# Patient Record
Sex: FEMALE | Race: WHITE | NOT HISPANIC OR LATINO | URBAN - METROPOLITAN AREA
[De-identification: names, ages, dates, MRNs, and addresses within clinical notes are randomized per-mention and may not be internally consistent; named-entity substitution may affect disease eponyms.]

---

## 2017-04-25 ENCOUNTER — IMPORTED ENCOUNTER (OUTPATIENT)
Dept: URBAN - METROPOLITAN AREA CLINIC 1 | Facility: CLINIC | Age: 77
End: 2017-04-25

## 2017-04-25 PROBLEM — Z79.4: Noted: 2017-04-25

## 2017-04-25 PROBLEM — H26.491: Noted: 2017-04-25

## 2017-04-25 PROBLEM — H16.143: Noted: 2017-04-25

## 2017-04-25 PROBLEM — H40.023: Noted: 2017-04-25

## 2017-04-25 PROBLEM — E11.9: Noted: 2017-04-25

## 2017-04-25 PROBLEM — Z79.84: Noted: 2017-04-25

## 2017-04-25 PROBLEM — Z96.1: Noted: 2017-04-25

## 2017-04-25 PROBLEM — H04.123: Noted: 2017-04-25

## 2017-04-25 PROCEDURE — 92014 COMPRE OPH EXAM EST PT 1/>: CPT

## 2017-04-25 PROCEDURE — 92133 CPTRZD OPH DX IMG PST SGM ON: CPT

## 2017-04-25 PROCEDURE — 92015 DETERMINE REFRACTIVE STATE: CPT

## 2017-04-25 NOTE — PATIENT DISCUSSION
1.  DM Type II without sign of diabetic retinopathy and no blot heme on dilated retinal examination today OU No Macular Edema:  Stable. Discussed the pathophysiology of diabetes and its effect on the eye and risk of blindness. Stressed the importance of strong glucose control. Advised of importance of at least yearly dilated examinations but to contact us immediately for any problems or concerns. 2. Type II diabetes controlled by oral medications and insulin. 3.  PCO OD- Visually Significant and yag cap recommended. Risks and benefits discussed with pt and pt desires to schedule yag cap. 4. Glaucoma Suspect OU (0.7/0.4/ () FHX: Stable IOP OU. Increased cup OD. Stable cup OS. Minimal thinning by OCT OU. no progression. Patient is considered high risk. Condition was discussed with patient and patient understands. Will continue to monitor patient for any progression in condition. Patient was advised to call us with any problems questions or concerns. 5.  ONEIL w/ PEK OU- Stable. The continuation of artificial tears were recommended. 6.  Pseudophakia OU- H/o Yag OS. 7. Return for an appointment for Yag Cap OD in 1-4 weeks with Dr. Gerson Lu.

## 2017-05-19 ENCOUNTER — IMPORTED ENCOUNTER (OUTPATIENT)
Dept: URBAN - METROPOLITAN AREA CLINIC 1 | Facility: CLINIC | Age: 77
End: 2017-05-19

## 2017-05-19 PROBLEM — H26.491: Noted: 2017-05-19

## 2017-05-19 PROCEDURE — 66821 AFTER CATARACT LASER SURGERY: CPT

## 2017-05-19 NOTE — PATIENT DISCUSSION
YAG CAP OD: (Consent signed and scanned into attachments) 1 gtt Prolensa applied. The purpose and nature of the procedure possible alternative methods of treatment the risks involved and the possibility of complications were discussed with patient. The Patient wishes to proceed and the consent was signed. The laser was then performed under topical anesthesia with no complications. Post op instructions were given to patient as well as a follow-up appointment. Patient was advised to call our office if any questions or concerns.  RTC in 6 weeks for Yag Cap

## 2017-05-26 ENCOUNTER — IMPORTED ENCOUNTER (OUTPATIENT)
Dept: URBAN - METROPOLITAN AREA CLINIC 1 | Facility: CLINIC | Age: 77
End: 2017-05-26

## 2017-05-26 PROBLEM — H04.123: Noted: 2017-05-26

## 2017-05-26 PROBLEM — Z96.1: Noted: 2017-05-26

## 2017-05-26 PROBLEM — H16.143: Noted: 2017-05-26

## 2017-05-26 PROBLEM — Z09: Noted: 2017-05-26

## 2017-05-26 PROBLEM — H40.023: Noted: 2017-05-26

## 2017-05-26 PROCEDURE — 92012 INTRM OPH EXAM EST PATIENT: CPT

## 2017-05-26 NOTE — PATIENT DISCUSSION
1.  Macular Hole OD (New)- Refer to retina specialists ASAP. 2.  ONEIL w/ PEK OU- Stable. The continuation of artificial tears were recommended. 3.  Glaucoma Suspect OU (0.7/0.4/ () FHX: Stable IOP OU. H/o Minimal thinning by OCT OU. Patient is considered high risk. 4.  PO YAG Cap OD: good result. Hold on RX until after seeing retina specialists for [facility]. Pseudophakia OD (PMG)/ OS (not PMG)- H/o Yag now OU. 6. RTC per retina specialists (when cleared by retina per PMG. )

## 2018-05-31 ENCOUNTER — IMPORTED ENCOUNTER (OUTPATIENT)
Dept: URBAN - METROPOLITAN AREA CLINIC 1 | Facility: CLINIC | Age: 78
End: 2018-05-31

## 2018-05-31 PROBLEM — Z96.1: Noted: 2018-05-31

## 2018-05-31 PROBLEM — H04.123: Noted: 2018-05-31

## 2018-05-31 PROBLEM — Z79.84: Noted: 2018-05-31

## 2018-05-31 PROBLEM — H40.023: Noted: 2018-05-31

## 2018-05-31 PROBLEM — E11.9: Noted: 2018-05-31

## 2018-05-31 PROBLEM — H16.143: Noted: 2018-05-31

## 2018-05-31 PROCEDURE — 92014 COMPRE OPH EXAM EST PT 1/>: CPT

## 2018-05-31 PROCEDURE — 92015 DETERMINE REFRACTIVE STATE: CPT

## 2018-05-31 NOTE — PATIENT DISCUSSION
1.  DM Type II without sign of diabetic retinopathy and no blot heme on dilated retinal examination today OU No Macular Edema: Stable. Discussed the pathophysiology of diabetes and its effect on the eye and risk of blindness. Stressed the importance of strong glucose control. Advised of importance of at least yearly dilated examinations but to contact us immediately for any problems or concerns. 2. Type II diabetes controlled by oral medications. 3.  Glaucoma Suspect OU (0.7/0.4/ () FHX Stable IOP OU. H/o Minimal thinning by OCT OU. Patient is considered high risk. 4. ONEIL w/ PEK OU- Stable. The continuation of artificial tears were recommended. 5.  Pseudophakia OU- H/o Yag OU. 6.  H/o Macular hole repair OD (Dr. Ba Tee. Return for an appointment for a 27 in 1 year with Dr. Stephane Pelayo.

## 2020-08-28 ENCOUNTER — IMPORTED ENCOUNTER (OUTPATIENT)
Dept: URBAN - METROPOLITAN AREA CLINIC 1 | Facility: CLINIC | Age: 80
End: 2020-08-28

## 2020-08-28 PROBLEM — Z79.84: Noted: 2020-08-28

## 2020-08-28 PROBLEM — H01.001: Noted: 2020-08-28

## 2020-08-28 PROBLEM — H16.143: Noted: 2020-08-28

## 2020-08-28 PROBLEM — H01.004: Noted: 2020-08-28

## 2020-08-28 PROBLEM — E11.9: Noted: 2020-08-28

## 2020-08-28 PROBLEM — H04.123: Noted: 2020-08-28

## 2020-08-28 PROCEDURE — 92014 COMPRE OPH EXAM EST PT 1/>: CPT

## 2021-03-01 ENCOUNTER — IMPORTED ENCOUNTER (OUTPATIENT)
Dept: URBAN - METROPOLITAN AREA CLINIC 1 | Facility: CLINIC | Age: 81
End: 2021-03-01

## 2021-03-01 PROBLEM — H04.123: Noted: 2021-03-01

## 2021-03-01 PROBLEM — H16.143: Noted: 2021-03-01

## 2021-03-01 PROCEDURE — 99214 OFFICE O/P EST MOD 30 MIN: CPT

## 2021-03-01 NOTE — PATIENT DISCUSSION
Pseudophakia OU - H/o YAG Cap OU. 3.  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 4. Glaucoma Suspect OU (0.70/0.40) - () FHX Stable IOP OU. H/o Minimal thinning by OCT OU. Patient is considered high risk. 5. H/o DM w/o DR OU 6. H/o Macular Hole repair OD (Dr. Venkat Gonzáles for an appointment in 3 months 10 (check ks on Restasis) with Dr. Darron Chaves.

## 2021-03-01 NOTE — PATIENT DISCUSSION
1.  ONEIL w/ increased PEK OU- No relief from ATs alone. Begin Restasis BID OU (erx). Recommend switch to PF ATs QID-Q2H OU routinely 2. Pseudophakia OU - H/o YAG Cap OU. 3.  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 4. Glaucoma Suspect OU (0.70/0.40) - () FHX Stable IOP OU. H/o Minimal thinning by OCT OU. Patient is considered high risk. 5. H/o DM w/o DR OU 6. H/o Macular Hole repair OD (Dr. Karly Chapa for an appointment in 3 months 10 (check ks on Restasis) with Dr. Shivani Mullen.

## 2021-06-07 ENCOUNTER — IMPORTED ENCOUNTER (OUTPATIENT)
Dept: URBAN - METROPOLITAN AREA CLINIC 1 | Facility: CLINIC | Age: 81
End: 2021-06-07

## 2021-06-07 PROBLEM — H04.123: Noted: 2021-06-07

## 2021-06-07 PROBLEM — H16.143: Noted: 2021-06-07

## 2021-06-07 PROCEDURE — 92012 INTRM OPH EXAM EST PATIENT: CPT

## 2021-06-07 NOTE — PATIENT DISCUSSION
1.  ONEIL w/ increased PEK OU- No relief from ATs alone. Begin Restasis BID OU (erx). Recommend switch to PF ATs QID-Q2H OU routinely 2. Pseudophakia OU - H/o YAG Cap OU. 3.  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 4. Glaucoma Suspect OU (0.70/0.40) - () FHX Stable IOP OU. H/o Minimal thinning by OCT OU. Patient is considered high risk. 5. H/o DM w/o  OU 6. H/o Macular Hole repair OD (Dr. [employer] for an appointment in 3 months 10 (check ks on Restasis) with Dr. Lyssa Weeks.

## 2021-06-07 NOTE — PATIENT DISCUSSION
1.  ONEIL w/ PEK OU -- Patient did not fill Restasis secondary to cost. Continue PF ATs QID-Q2H OU routinely. Compliance urged. 2.  Pseudophakia OU -- H/o YAG Cap OU. 3.  Anterior Blepharitis OU -- Daily Hot compresses and lid scrubs were recommended. 4. Glaucoma Suspect OU -- (0.70/0.40) () FHX Stable IOP OU. H/o Minimal thinning by OCT OU. Patient is considered high risk. 5. H/o DM w/o  OU 6. H/o Macular Hole repair OD (Dr. Dania Brandon for an appointment 4 months for a 30/OCT with Dr. Mara Salinas.

## 2021-10-29 ENCOUNTER — IMPORTED ENCOUNTER (OUTPATIENT)
Dept: URBAN - METROPOLITAN AREA CLINIC 1 | Facility: CLINIC | Age: 81
End: 2021-10-29

## 2021-10-29 PROBLEM — E11.9: Noted: 2021-10-29

## 2021-10-29 PROBLEM — Z96.1: Noted: 2021-10-29

## 2021-10-29 PROBLEM — H35.372: Noted: 2021-10-29

## 2021-10-29 PROBLEM — H16.143: Noted: 2021-10-29

## 2021-10-29 PROBLEM — H04.123: Noted: 2021-10-29

## 2021-10-29 PROBLEM — H01.021: Noted: 2021-10-29

## 2021-10-29 PROBLEM — H01.024: Noted: 2021-10-29

## 2021-10-29 PROBLEM — H40.023: Noted: 2021-10-29

## 2021-10-29 PROBLEM — Z79.84: Noted: 2021-10-29

## 2021-10-29 PROBLEM — H01.001: Noted: 2021-10-29

## 2021-10-29 PROBLEM — H01.004: Noted: 2021-10-29

## 2021-10-29 PROCEDURE — 92133 CPTRZD OPH DX IMG PST SGM ON: CPT

## 2021-10-29 PROCEDURE — 99214 OFFICE O/P EST MOD 30 MIN: CPT

## 2021-10-29 NOTE — PATIENT DISCUSSION
1.  DM Type II (Oral Medication) without sign of diabetic retinopathy and no blot heme on dilated retinal examination today OU No Macular Edema:  Discussed the pathophysiology of diabetes and its effect on the eye and risk of blindness. Stressed the importance of strong glucose control. Advised of importance of at least yearly dilated examinations but to contact us immediately for any problems or concerns. 2. Glaucoma Suspect OU (CD 0.70/0.40): No progression by OCT. IOP stable. Family hx. Patient is considered high risk. Condition was discussed with patient and patient understands. Will continue to monitor patient for any progression in condition. Patient was advised to call us with any problems questions or concerns. 3.  ONEIL w/ PEK OU- Recommend ATs TID OU routinely 4. Pseudophakia OU - H/o YAG Cap OU 5. 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 6. Epiretinal Membrane OS - Observe for change. 7. H/o Macular Hole repair OD (Dr. Victor Hugo Nava for an appointment in 6 months DFE/MAC OCT with Dr. Supriya Galdamez.

## 2022-04-02 ASSESSMENT — TONOMETRY
OS_IOP_MMHG: 16
OS_IOP_MMHG: 15
OD_IOP_MMHG: 18
OS_IOP_MMHG: 17
OD_IOP_MMHG: 18
OS_IOP_MMHG: 17
OS_IOP_MMHG: 18
OS_IOP_MMHG: 17
OD_IOP_MMHG: 15
OD_IOP_MMHG: 17
OD_IOP_MMHG: 16
OD_IOP_MMHG: 17
OD_IOP_MMHG: 16

## 2022-04-02 ASSESSMENT — VISUAL ACUITY
OS_CC: 20/30
OS_CC: 20/30
OD_CC: 20/30
OD_CC: 20/25-2
OD_CC: 20/30
OD_CC: 20/100
OS_CC: 20/25
OS_CC: 20/25
OS_CC: 20/30
OD_CC: 20/30
OS_CC: 20/30
OS_CC: 20/30
OD_CC: 20/80
OD_CC: 20/30

## 2022-07-05 ENCOUNTER — FOLLOW UP (OUTPATIENT)
Dept: URBAN - METROPOLITAN AREA CLINIC 1 | Facility: CLINIC | Age: 82
End: 2022-07-05

## 2022-07-05 DIAGNOSIS — H35.372: ICD-10-CM

## 2022-07-05 DIAGNOSIS — E11.9: ICD-10-CM

## 2022-07-05 PROCEDURE — 99213 OFFICE O/P EST LOW 20 MIN: CPT

## 2022-07-05 PROCEDURE — 92134 CPTRZ OPH DX IMG PST SGM RTA: CPT

## 2022-07-05 ASSESSMENT — VISUAL ACUITY
OD_SC: 20/25-2
OS_SC: 20/20-1

## 2022-07-05 ASSESSMENT — TONOMETRY
OS_IOP_MMHG: 15
OD_IOP_MMHG: 15

## 2022-07-05 NOTE — PATIENT DISCUSSION
(CD 0.70/0.40) IOP stable. Patient is considered high risk. Condition was discussed with patient and patient understands. Will continue to monitor patient for any progression in condition. Patient was advised to call us with any problems, questions, or concerns.

## 2022-08-15 ENCOUNTER — EMERGENCY VISIT (OUTPATIENT)
Dept: URBAN - METROPOLITAN AREA CLINIC 1 | Facility: CLINIC | Age: 82
End: 2022-08-15

## 2022-08-15 DIAGNOSIS — H04.123: ICD-10-CM

## 2022-08-15 DIAGNOSIS — H10.33: ICD-10-CM

## 2022-08-15 DIAGNOSIS — H16.143: ICD-10-CM

## 2022-08-15 PROCEDURE — 99214 OFFICE O/P EST MOD 30 MIN: CPT

## 2022-08-15 ASSESSMENT — TONOMETRY
OS_IOP_MMHG: 16
OD_IOP_MMHG: 16

## 2022-08-15 ASSESSMENT — VISUAL ACUITY
OD_SC: 20/60
OS_SC: 20/30

## 2022-08-19 ENCOUNTER — FOLLOW UP (OUTPATIENT)
Dept: URBAN - METROPOLITAN AREA CLINIC 1 | Facility: CLINIC | Age: 82
End: 2022-08-19

## 2022-08-19 DIAGNOSIS — H16.143: ICD-10-CM

## 2022-08-19 DIAGNOSIS — H04.123: ICD-10-CM

## 2022-08-19 DIAGNOSIS — H10.33: ICD-10-CM

## 2022-08-19 PROCEDURE — 99213 OFFICE O/P EST LOW 20 MIN: CPT

## 2022-08-19 ASSESSMENT — TONOMETRY
OD_IOP_MMHG: 14
OS_IOP_MMHG: 14

## 2022-08-19 ASSESSMENT — VISUAL ACUITY
OD_SC: 20/30
OS_SC: 20/25

## 2022-08-19 NOTE — PATIENT DISCUSSION
Begin hot compresses TID x 5 minutes for 3 weeks. If without improvement discussed with patient possible Incision and Drainage procedure. Risks and benefits discussed with patient and patient states full understanding.

## 2022-08-19 NOTE — PATIENT DISCUSSION
Much improved. Use Tobradex ST BID OU until samples gone. Continue PF AT's Q2Hr OU and Pataday QD OU.

## 2022-08-26 ENCOUNTER — EMERGENCY VISIT (OUTPATIENT)
Dept: URBAN - METROPOLITAN AREA CLINIC 1 | Facility: CLINIC | Age: 82
End: 2022-08-26

## 2022-08-26 DIAGNOSIS — H10.33: ICD-10-CM

## 2022-08-26 DIAGNOSIS — H16.143: ICD-10-CM

## 2022-08-26 DIAGNOSIS — H04.123: ICD-10-CM

## 2022-08-26 PROCEDURE — 99213 OFFICE O/P EST LOW 20 MIN: CPT

## 2022-08-26 ASSESSMENT — VISUAL ACUITY
OD_SC: 20/60
OS_SC: 20/25
OD_PH: 20/30

## 2022-08-26 ASSESSMENT — TONOMETRY
OD_IOP_MMHG: 15
OS_IOP_MMHG: 14

## 2022-08-26 NOTE — PATIENT DISCUSSION
Begin Maxitrol Negar QHS OD onto upper and lower lid (Erx'd). Continue hot compresses BID x 5 minutes.

## 2022-09-12 ENCOUNTER — FOLLOW UP (OUTPATIENT)
Dept: URBAN - METROPOLITAN AREA CLINIC 1 | Facility: CLINIC | Age: 82
End: 2022-09-12

## 2022-09-12 DIAGNOSIS — H01.111: ICD-10-CM

## 2022-09-12 PROCEDURE — 99213 OFFICE O/P EST LOW 20 MIN: CPT

## 2022-09-12 ASSESSMENT — TONOMETRY
OD_IOP_MMHG: 14
OS_IOP_MMHG: 14

## 2022-09-12 ASSESSMENT — VISUAL ACUITY
OS_SC: 20/25
OD_SC: 20/30-2

## 2022-09-12 NOTE — PATIENT DISCUSSION
Improving during today's visit. Recommend patient using Ocusoft lid scrubs QDaily OU. Continue Maxitrol Negar PRN OD onto upper and lower lid. Continue hot compresses BID x 5 minutes.

## 2023-10-27 ENCOUNTER — COMPREHENSIVE EXAM (OUTPATIENT)
Dept: URBAN - METROPOLITAN AREA CLINIC 2 | Facility: CLINIC | Age: 83
End: 2023-10-27

## 2023-10-27 DIAGNOSIS — H35.341: ICD-10-CM

## 2023-10-27 DIAGNOSIS — E11.9: ICD-10-CM

## 2023-10-27 DIAGNOSIS — H04.123: ICD-10-CM

## 2023-10-27 DIAGNOSIS — Z96.1: ICD-10-CM

## 2023-10-27 DIAGNOSIS — H01.111: ICD-10-CM

## 2023-10-27 DIAGNOSIS — H10.45: ICD-10-CM

## 2023-10-27 DIAGNOSIS — H40.023: ICD-10-CM

## 2023-10-27 DIAGNOSIS — H01.021: ICD-10-CM

## 2023-10-27 DIAGNOSIS — H35.722: ICD-10-CM

## 2023-10-27 DIAGNOSIS — H16.143: ICD-10-CM

## 2023-10-27 DIAGNOSIS — H01.024: ICD-10-CM

## 2023-10-27 DIAGNOSIS — H43.813: ICD-10-CM

## 2023-10-27 PROCEDURE — 92014 COMPRE OPH EXAM EST PT 1/>: CPT

## 2023-10-27 PROCEDURE — 92134 CPTRZ OPH DX IMG PST SGM RTA: CPT

## 2023-10-27 RX ORDER — NEOMYCIN SULFATE, POLYMYXIN B SULFATE AND DEXAMETHASONE 3.5; 10000; 1 MG/G; [USP'U]/G; MG/G: OINTMENT OPHTHALMIC AS NEEDED

## 2023-10-27 RX ORDER — OLOPATADINE HYDROCHLORIDE 2 MG/ML: 1 SOLUTION OPHTHALMIC ONCE A DAY

## 2023-10-27 ASSESSMENT — VISUAL ACUITY
OD_SC: 20/80
OS_CC: J5
OS_SC: 20/40
OD_PH: 20/40
OD_CC: J7

## 2023-10-27 ASSESSMENT — KERATOMETRY
OS_K2POWER_DIOPTERS: 45.75
OD_AXISANGLE2_DEGREES: 009
OS_AXISANGLE2_DEGREES: 081
OS_AXISANGLE_DEGREES: 171
OS_K1POWER_DIOPTERS: 45.00
OD_AXISANGLE_DEGREES: 99
OD_K1POWER_DIOPTERS: 43.50
OD_K2POWER_DIOPTERS: 45.00

## 2023-10-27 ASSESSMENT — TONOMETRY
OD_IOP_MMHG: 14
OS_IOP_MMHG: 14

## 2024-06-13 ENCOUNTER — EMERGENCY VISIT (OUTPATIENT)
Dept: URBAN - METROPOLITAN AREA CLINIC 2 | Facility: CLINIC | Age: 84
End: 2024-06-13

## 2024-06-13 DIAGNOSIS — H04.123: ICD-10-CM

## 2024-06-13 DIAGNOSIS — H35.712: ICD-10-CM

## 2024-06-13 DIAGNOSIS — H35.81: ICD-10-CM

## 2024-06-13 DIAGNOSIS — H35.343: ICD-10-CM

## 2024-06-13 DIAGNOSIS — H16.143: ICD-10-CM

## 2024-06-13 PROCEDURE — 99213 OFFICE O/P EST LOW 20 MIN: CPT

## 2024-06-13 PROCEDURE — 92134 CPTRZ OPH DX IMG PST SGM RTA: CPT

## 2024-06-13 RX ORDER — OLOPATADINE HYDROCHLORIDE 2 MG/ML: 1 SOLUTION OPHTHALMIC ONCE A DAY

## 2024-06-13 ASSESSMENT — KERATOMETRY
OD_AXISANGLE_DEGREES: 99
OS_AXISANGLE_DEGREES: 171
OS_AXISANGLE2_DEGREES: 081
OD_K1POWER_DIOPTERS: 43.50
OS_K2POWER_DIOPTERS: 45.75
OD_AXISANGLE2_DEGREES: 009
OS_K1POWER_DIOPTERS: 45.00
OD_K2POWER_DIOPTERS: 45.00

## 2024-06-13 ASSESSMENT — VISUAL ACUITY
OD_SC: 20/60
OS_SC: 20/40

## 2024-06-13 ASSESSMENT — TONOMETRY
OS_IOP_MMHG: 15
OD_IOP_MMHG: 15